# Patient Record
Sex: FEMALE | Race: WHITE
[De-identification: names, ages, dates, MRNs, and addresses within clinical notes are randomized per-mention and may not be internally consistent; named-entity substitution may affect disease eponyms.]

---

## 2020-10-06 ENCOUNTER — HOSPITAL ENCOUNTER (EMERGENCY)
Dept: HOSPITAL 43 - DL.ED | Age: 80
Discharge: HOME | End: 2020-10-06
Payer: MEDICARE

## 2020-10-06 DIAGNOSIS — Z88.8: ICD-10-CM

## 2020-10-06 DIAGNOSIS — G89.29: ICD-10-CM

## 2020-10-06 DIAGNOSIS — H55.00: ICD-10-CM

## 2020-10-06 DIAGNOSIS — R42: Primary | ICD-10-CM

## 2020-10-06 DIAGNOSIS — M25.551: ICD-10-CM

## 2020-10-06 LAB
ANION GAP SERPL CALC-SCNC: 16.7 MEQ/L (ref 7–13)
CHLORIDE SERPL-SCNC: 101 MMOL/L (ref 98–107)
SODIUM SERPL-SCNC: 140 MMOL/L (ref 136–145)

## 2020-10-06 PROCEDURE — 83735 ASSAY OF MAGNESIUM: CPT

## 2020-10-06 PROCEDURE — 36415 COLL VENOUS BLD VENIPUNCTURE: CPT

## 2020-10-06 PROCEDURE — 84484 ASSAY OF TROPONIN QUANT: CPT

## 2020-10-06 PROCEDURE — 70450 CT HEAD/BRAIN W/O DYE: CPT

## 2020-10-06 PROCEDURE — 99285 EMERGENCY DEPT VISIT HI MDM: CPT

## 2020-10-06 PROCEDURE — 93005 ELECTROCARDIOGRAM TRACING: CPT

## 2020-10-06 PROCEDURE — 85025 COMPLETE CBC W/AUTO DIFF WBC: CPT

## 2020-10-06 PROCEDURE — 80053 COMPREHEN METABOLIC PANEL: CPT

## 2020-10-06 NOTE — EDM.PDOC
ED HPI GENERAL MEDICAL PROBLEM





- General


Chief Complaint: General


Stated Complaint: ambulance


Time Seen by Provider: 10/06/20 12:24


Source of Information: Reports: Patient, EMS, RN, RN Notes Reviewed


History Limitations: Reports: No Limitations





- History of Present Illness


INITIAL COMMENTS - FREE TEXT/NARRATIVE: 


Pt arrives to ER from home by ambulance with c/o severe "spinning" dizziness 

with nausea that began suddenly at 1100HRS this morning. Pt sat still for a 

while and the symptoms subsided, but when she moved again the dizziness 

returned. Pt states she has had similar episodes in the past but they were not 

this severe, and didn't last this long so she did not seek medical attention. 

She denies headache, LOC, neck pain, visual changes, fall or injury. She admits 

to right hip pain, but states that is chronic. 





Onset: Today, Sudden


Duration: Intermittent, Recurring, Waxing/Waning


Location: Reports: Generalized


Quality: Reports: Other (Denies pain)


Severity: Severe


Improves with: Reports: Immobilization


Worsens with: Reports: Movement (of body or head)


Associated Symptoms: Reports: No Other Symptoms


  ** Left Hip


Pain Score (Numeric/FACES): 7





- Related Data


                                    Allergies











Allergy/AdvReac Type Severity Reaction Status Date / Time


 


ACE Inhibitors Allergy  Cannot Verified 10/06/20 13:37





   Remember  














Past Medical History


Endocrine/Metabolic History: Reports: Diabetes, Type II





Social & Family History





- Family History


Family Medical History: Noncontributory





- Living Situation & Occupation


Occupation: Retired





ED ROS GENERAL





- Review of Systems


Review Of Systems: Comprehensive ROS is negative, except as noted in HPI.





ED EXAM, GENERAL





- Physical Exam


Exam: See Below


Exam Limited By: No Limitations


General Appearance: Alert, WD/WN, No Apparent Distress


Eye Exam: Bilateral Eye: EOMI, Nystagmus (left lateral gaze), PERRL


Ear Exam: Right Ear: TM Dull, Left Ear: TM normal, Bilateral Ear: Auricle 

Normal, Canal Normal


Nose: Normal Inspection, Normal Mucosa, No Blood


Throat/Mouth: Normal Inspection, Normal Lips, Normal Teeth, Normal Gums, Normal 

Oropharynx, Normal Voice, No Airway Compromise


Head: Atraumatic, Normocephalic


Neck: Normal Inspection, Supple, Non-Tender, Full Range of Motion.  No: Carotid 

Bruit


Respiratory/Chest: No Respiratory Distress, Lungs Clear, Normal Breath Sounds, 

No Accessory Muscle Use, Chest Non-Tender


Cardiovascular: Regular Rate, Rhythm, No Edema


GI/Abdominal: Normal Bowel Sounds, Soft, Non-Tender, No Organomegaly, No 

Distention, No Abnormal Bruit, No Mass


Back Exam: Normal Inspection


Extremities: Normal Inspection, Normal Range of Motion, Non-Tender, Normal 

Capillary Refill, No Pedal Edema


Neurological: Alert, Oriented, CN II-XII Intact, Normal Cognition, Normal Gait, 

Normal Reflexes, No Motor/Sensory Deficits, Other (Reproducible vertiginous 

dizziness with head movement and body movement.)


Psychiatric: Normal Affect, Normal Mood


Skin Exam: Warm, Dry, Intact, Normal Color, No Rash





EKG INTERPRETATION


EKG Date: 10/06/20


Time: 12:17


Rhythm: Other (SR)


Rate (Beats/Min): 74


Axis: LAD-Left Axis Deviation


P-Wave: Present


QRS: Other (LAFB, LVH)


ST-T: Normal


QT: Normal


Comparison: NA - No Prior EKG





Course





- Vital Signs


Last Recorded V/S: 


                                Last Vital Signs











Temp  97.6 F   10/06/20 12:42


 


Pulse  79   10/06/20 12:42


 


Resp  20   10/06/20 12:42


 


BP      


 


Pulse Ox  96   10/06/20 12:42














- Orders/Labs/Meds


Orders: 


                               Active Orders 24 hr











 Category Date Time Status


 


 EKG 12 Lead [EKG Documentation Completion] [RC] STAT Care  10/06/20 12:24 

Active


 


 Peripheral IV Care [RC] .AS DIRECTED Care  10/06/20 12:25 Active


 


 DRUG SCREEN URINE BIORAD [URCHEM] Stat Lab  10/06/20 12:25 Ordered


 


 UA RFX DIMA AND CULT IF INDIC [URIN] Stat Lab  10/06/20 12:25 Ordered


 


 Sodium Chloride 0.9% [Saline Flush] Med  10/06/20 12:25 Active





 10 ml FLUSH ASDIRECTED PRN   


 


 Peripheral IV Insertion Adult [OM.PC] Stat Oth  10/06/20 12:24 Ordered








                                Medication Orders





Sodium Chloride (Saline Flush)  10 ml FLUSH ASDIRECTED PRN


   PRN Reason: Keep Vein Open








Labs: 


                                Laboratory Tests











  10/06/20 10/06/20 Range/Units





  12:47 12:47 


 


WBC  10.1 H   (5.0-10.0)  10^3/uL


 


RBC  3.70 L   (4.2-5.4)  10^6/uL


 


Hgb  9.0 L   (12.0-16.0)  g/dL


 


Hct  29.6 L   (37.0-47.0)  %


 


MCV  80.0   ()  fL


 


MCH  24.3 L   (27.0-34.0)  pg


 


MCHC  30.4 L   (33.0-35.0)  g/dL


 


Plt Count  396   (150-450)  10^3/uL


 


Neut % (Auto)  82.3 H   (42.2-75.2)  %


 


Lymph % (Auto)  10.5 L   (20.5-50.1)  %


 


Mono % (Auto)  5.4   (2-8)  %


 


Eos % (Auto)  1.7   (1.0-3.0)  %


 


Baso % (Auto)  0.1   (0.0-1.0)  %


 


Sodium   140  (136-145)  mmol/L


 


Potassium   3.7  (3.5-5.1)  mmol/L


 


Chloride   101  ()  mmol/L


 


Carbon Dioxide   26  (21-32)  mmol/L


 


Anion Gap   16.7 H  (7-13)  mEq/L


 


BUN   12  (7-18)  mg/dL


 


Creatinine   1.02  (0.55-1.02)  mg/dL


 


Est Cr Clr Drug Dosing   38.62  mL/min


 


Estimated GFR (MDRD)   52  


 


BUN/Creatinine Ratio   11.8  (No establ ref range)  


 


Glucose   122 H  (74-99)  mg/dL


 


Calcium   9.1  (8.5-10.1)  mg/dL


 


Magnesium   1.7 L  (1.8-2.4)  mg/dL


 


Total Bilirubin   0.3  (0.2-1.0)  mg/dL


 


AST   16  (15-37)  U/L


 


ALT   12 L  (14-59)  U/L


 


Alkaline Phosphatase   100  ()  U/L


 


Troponin I   < 0.017  (0.000-0.056)  ng/mL


 


Total Protein   7.1  (6.4-8.2)  g/dL


 


Albumin   3.1 L  (3.4-5.0)  g/dL


 


Globulin   4.0  


 


Albumin/Globulin Ratio   0.78  











Meds: 


Medications











Generic Name Dose Route Start Last Admin





  Trade Name Freq  PRN Reason Stop Dose Admin


 


Sodium Chloride  10 ml  10/06/20 12:25 





  Saline Flush  FLUSH  





  ASDIRECTED PRN  





  Keep Vein Open  














Discontinued Medications














Generic Name Dose Route Start Last Admin





  Trade Name Lashaun  PRN Reason Stop Dose Admin


 


Meclizine HCl  25 mg  10/06/20 12:25  10/06/20 12:37





  Antivert  PO  10/06/20 12:26  25 mg





  ONETIME ONE   Administration


 


Ondansetron HCl  4 mg  10/06/20 14:12  10/06/20 14:18





  Zofran Odt  PO  10/06/20 14:13  4 mg





  ONETIME ONE   Administration


 


Oxycodone/Acetaminophen  1 tab  10/06/20 14:12  10/06/20 14:18





  Percocet 325-5 Mg  PO  10/06/20 14:13  1 tab





  ONETIME ONE   Administration














- Radiology Interpretation


Free Text/Narrative:: 


Subtle abnormalities right parietal/left occipital lobes, no intracranial mass 

effect, hydrocephalus, or bleed per Rad. report.








- Re-Assessments/Exams


Free Text/Narrative Re-Assessment/Exam: 





10/06/20 14:28


Pt's dizziness has resolved following tx in ER.





Departure





- Departure


Time of Disposition: 14:23


Disposition: Home, Self-Care 01


Condition: Good


Clinical Impression: 


 Vertigo, Chronic right hip pain








- Discharge Information


*PRESCRIPTION DRUG MONITORING PROGRAM REVIEWED*: No


*COPY OF PRESCRIPTION DRUG MONITORING REPORT IN PATIENT ANISH: No


Instructions:  Vertigo, Hip Pain


Forms:  ED Department Discharge


Additional Instructions: 


Rx: Meclizine 25mg (for dizziness/vertigo)


Rx: Norco 5mg/325mg (for severe pain)


Do not drive for 8 hours after taking either of these medications.


Follow up in clinic with Nazia Pineda within the next one week for recheck.


Ask your doctor and/or specialist to review your Head CT scan (available on 

[facility] [facility] systems).





Sepsis Event Note (ED)





- Focused Exam


Vital Signs: 


                                   Vital Signs











  Temp Pulse Resp Pulse Ox


 


 10/06/20 12:42  97.6 F  79  20  96














- My Orders


Last 24 Hours: 


My Active Orders





10/06/20 12:24


EKG 12 Lead [EKG Documentation Completion] [RC] STAT 


Peripheral IV Insertion Adult [OM.PC] Stat 





10/06/20 12:25


Peripheral IV Care [RC] .AS DIRECTED 


DRUG SCREEN URINE BIORAD [URCHEM] Stat 


UA RFX DIMA AND CULT IF INDIC [URIN] Stat 


Sodium Chloride 0.9% [Saline Flush]   10 ml FLUSH ASDIRECTED PRN 














- Assessment/Plan


Last 24 Hours: 


My Active Orders





10/06/20 12:24


EKG 12 Lead [EKG Documentation Completion] [RC] STAT 


Peripheral IV Insertion Adult [OM.PC] Stat 





10/06/20 12:25


Peripheral IV Care [RC] .AS DIRECTED 


DRUG SCREEN URINE BIORAD [URCHEM] Stat 


UA RFX DIMA AND CULT IF INDIC [URIN] Stat 


Sodium Chloride 0.9% [Saline Flush]   10 ml FLUSH ASDIRECTED PRN

## 2020-10-06 NOTE — CT
EXAMINATION: Head wo Cont  SEX: Female   AGE: 80 years

 

CLINICAL HISTORY: 80-year-old female experiencing  ACUTE DIZZINESS with nausea

(breast cancer diagnosed 2011). No known trauma. No comparison exams (CT or MRI)

head immediately available.

 

Scan technique: Volume acquisition of data emergency unenhanced CT scan of the

head and brain obtained with the patient lying supine on the Siemens multi slice

scanner [place], [place]. All data archived in

the PACS system for storage, reformatting axial/sagittal/coronal planes and

study (bone/brain windows).

 

Interpretation:

1. Uniformly thick bony calvarium. No sign of pathologic skeletal lesion, skull

fracture, underlying brain contusion or abnormal extracerebral/intracranial

epidural or subdural hematoma.

2. Physiologic midline pineal/falx and symmetric choroid plexus calcifications.

3. *Subtle areas of decreased attenuation parietal lobe on the right and

occipital lobe posteriorly left cerebral hemisphere that may represent

microvascular ischemic change or infarcts but in light of the patient's clinical

history (breast cancer) suggest follow-up unenhanced MRI exam or

contrast-enhanced CT. 

4. No supratentorial or posterior fossa mass effect. Symmetric normal-appearing

ventricular system.

5. No sign of acute intracerebral, intraventricular or subarachnoid bleed.

6. Cerebellum and brainstem unremarkable.

7. Symmetric clear pneumatization of the paranasal and mastoid sinuses.

 

INTERPRETATION: Subtle abnormalities right parietal/left occipital lobes (see

above).

No intracranial mass effect, hydrocephalus or bleed.

## 2020-10-14 ENCOUNTER — HOSPITAL ENCOUNTER (OUTPATIENT)
Dept: HOSPITAL 43 - DL.ENDO | Age: 80
Discharge: HOME | End: 2020-10-14
Attending: INTERNAL MEDICINE
Payer: MEDICARE

## 2020-10-14 DIAGNOSIS — I12.9: ICD-10-CM

## 2020-10-14 DIAGNOSIS — Z90.710: ICD-10-CM

## 2020-10-14 DIAGNOSIS — K25.9: Primary | ICD-10-CM

## 2020-10-14 DIAGNOSIS — N18.9: ICD-10-CM

## 2020-10-14 DIAGNOSIS — Z88.8: ICD-10-CM

## 2020-10-14 DIAGNOSIS — D50.9: ICD-10-CM

## 2020-10-14 DIAGNOSIS — Z98.890: ICD-10-CM

## 2020-10-14 DIAGNOSIS — Z90.10: ICD-10-CM

## 2020-10-14 DIAGNOSIS — M51.86: ICD-10-CM

## 2020-10-14 DIAGNOSIS — Z85.3: ICD-10-CM

## 2020-10-14 PROCEDURE — 87077 CULTURE AEROBIC IDENTIFY: CPT

## 2020-10-14 PROCEDURE — 43239 EGD BIOPSY SINGLE/MULTIPLE: CPT

## 2020-10-14 NOTE — OR
DATE:  10/14/2020

 

PROCEDURE:  Esophagogastroduodenoscopy and multiple pinch biopsies.

 

INSTRUMENT USED:  GIF- Olympus video panendoscope.

 

PREMEDICATIONS:  No oral or topical anesthesia used.  Fentanyl 50 mcg

intravenous, Versed 1 mg intravenous, nasal O2 cannula.

 

The procedure was done under pulse oximetry, BP recording, and cardiac monitor.

 

INDICATION:  The patient with unexplained iron-deficiency anemia.

Esophagogastroduodenoscopy is performed for detection of any active erosive

lesions, Mortensen esophagus and/or malignancy also under consideration, H pylori

status to be determined, endoscopic hemostasis therapy if needed.

 

PROCEDURE IN DETAIL:  The scope was passed with ease.  Adequate visualization of

the esophagus was made from proximal to distal areas.  No upper esophageal

lesions identified.  No distal esophageal stricture.  No uphill or downhill

esophageal varices.  No Chari-Casillas tear.  No evidence of erosive esophagitis

by [place] criteria.  No esophageal polyp or tumor mass identified.  Z-line

was seen at around 39 cm distal to the oral verge, configuration consistent with

grade 1 by ZAP classification.  No proximal gastric varices noted.  Gastric

fundus examination by retroflexion showed no polypoid lesions.  In the mid

gastric body, multiple benign-appearing erosions were noted without bleeding

from them.  No gastric ulcer, malignant mass, or vascular ectasia identified.

Multiple pinch biopsies were taken from the gastric antrum and proximal body and

sent for PyloriTek test for H pylori, and if negative in an hour, the tissue is

to be sent for histopathology.  Duodenal bulb showed no ulcer.  Visualized

second part of the duodenum is unremarkable.  No bleeding was noted from any of

the visualized areas.  Photographs were taken of the duodenal bulb, gastric

antrum, fundus, and distal esophagus.

 

IMPRESSION:  Gastric body erosions.

 

The patient tolerated the procedure well.

 

DD:  10/14/2020 07:19:57

DT:  10/14/2020 07:44:13

[facility]

Job #:  [id number]

## 2020-10-20 ENCOUNTER — HOSPITAL ENCOUNTER (OUTPATIENT)
Dept: HOSPITAL 43 - DL.ENDO | Age: 80
End: 2020-10-20
Attending: INTERNAL MEDICINE
Payer: MEDICARE

## 2020-10-20 DIAGNOSIS — K57.30: ICD-10-CM

## 2020-10-20 DIAGNOSIS — Z90.10: ICD-10-CM

## 2020-10-20 DIAGNOSIS — N18.9: ICD-10-CM

## 2020-10-20 DIAGNOSIS — D12.0: Primary | ICD-10-CM

## 2020-10-20 DIAGNOSIS — Z88.8: ICD-10-CM

## 2020-10-20 DIAGNOSIS — I12.9: ICD-10-CM

## 2020-10-20 DIAGNOSIS — I25.10: ICD-10-CM

## 2020-10-20 DIAGNOSIS — D50.9: ICD-10-CM

## 2020-10-20 NOTE — OR
DATE:  10/20/2020

 

PROCEDURES:  Total colonoscopy, narrow-band imaging and multiple pinch biopsies.

 

INSTRUMENT USED:  PCF-H190DL Olympus video colonoscope.

 

PREMEDICATIONS:  Fentanyl 100 mcg intravenous, Versed 4 mg intravenous.  Nasal

O2 cannula.

 

The procedure was done under pulse oximetry, BP recording, and cardiac monitor.

 

INDICATION:  The patient with iron deficiency anemia.  Colonoscopic examination

is done for detection of any polypoid lesions and removal, endoscopic hemostasis

therapy if needed.

 

DESCRIPTION OF PROCEDURE:  Initial rectal exam was unremarkable.  Rigid anoscopy

showed some prolapsing distal rectal mucosa.  The colonoscope was passed with

ease.  Numerous scattered diverticula were noted in the distal left colon along

with deformity.  The scope was passed with ease up to the ileocecal area.

Photographs were taken of the ileocecal area showing large circumferential

malignant mass around the ileocecal junction, NBI views were obtained,

photographs were taken.  No bleeding was noted from any of the visualized areas

at the commencement of the examination.  The bowel preparation was found to be

adequate. Fackler scale 2 in the right colon and 3 in other regions, total score

8.  Numerous pinch biopsies were taken from the malignant mass and sent for

histopathology.  No vascular ectasia.  No large isolated ulcerations seen.  No

evidence of diffuse inflammatory bowel disease in the form of friability,

contact bleeding, or ulcerations.  Probing the proximal sides of folds and

flexures using adequate distention and clearing up the stool material,

withdrawal of the scope was made, cecum to rectum time over 6 minutes.  No

bleeding was noted from any of the visualized areas at the completion of

examination.

 

IMPRESSION:  Diverticulosis.

Cecal malignancy.

The patient tolerated the procedure well.

 

DD:  10/20/2020 08:05:04

DT:  10/20/2020 09:02:39

[facility]

Job #:  [id number]

## 2023-05-28 ENCOUNTER — HOSPITAL ENCOUNTER (EMERGENCY)
Dept: HOSPITAL 43 - DL.ED | Age: 83
Discharge: HOME | End: 2023-05-28
Payer: MEDICARE

## 2023-05-28 DIAGNOSIS — C34.90: ICD-10-CM

## 2023-05-28 DIAGNOSIS — I25.10: ICD-10-CM

## 2023-05-28 DIAGNOSIS — J44.9: ICD-10-CM

## 2023-05-28 DIAGNOSIS — E11.22: ICD-10-CM

## 2023-05-28 DIAGNOSIS — I13.0: ICD-10-CM

## 2023-05-28 DIAGNOSIS — J44.1: ICD-10-CM

## 2023-05-28 DIAGNOSIS — Z87.891: ICD-10-CM

## 2023-05-28 DIAGNOSIS — U07.1: Primary | ICD-10-CM

## 2023-05-28 DIAGNOSIS — I50.9: ICD-10-CM

## 2023-05-28 DIAGNOSIS — E87.6: ICD-10-CM

## 2023-05-28 DIAGNOSIS — J10.1: ICD-10-CM

## 2023-05-28 DIAGNOSIS — M79.89: ICD-10-CM

## 2023-05-28 DIAGNOSIS — N18.9: ICD-10-CM

## 2023-05-28 LAB
ALBUMIN SERPL-MCNC: 2.6 G/DL (ref 3.4–5)
ALBUMIN/GLOB SERPL: 0.68 {RATIO}
ALP SERPL-CCNC: 88 U/L (ref 46–116)
ALT SERPL-CCNC: 19 U/L (ref 14–59)
AMPHET UR QL SCN: NEGATIVE
AMPHET UR QL SCN: NEGATIVE
AMPHETAMINES UR QL SCN>500 NG/ML: NEGATIVE
ANION GAP SERPL CALC-SCNC: 9.2 MEQ/L (ref 7–13)
APPEARANCE UR: CLEAR
APTT PPP: 26.6 SEC (ref 22–34)
AST SERPL-CCNC: 23 U/L (ref 15–37)
BACTERIA URNS QL MICRO: (no result) /HPF
BARBITURATES UR QL SCN: NEGATIVE
BASOPHILS NFR BLD AUTO: 0.1 % (ref 0–1)
BILIRUB SERPL-MCNC: 0.4 MG/DL (ref 0.2–1)
BILIRUB UR STRIP-MCNC: NEGATIVE MG/DL
BNP SERPL-MCNC: 188 PG/ML (ref 0–100)
BUN SERPL-MCNC: 21 MG/DL (ref 7–18)
BUN/CREAT SERPL: 25
CALCIUM SERPL-MCNC: 9.2 MG/DL (ref 8.5–10.1)
CHLORIDE SERPL-SCNC: 99 MMOL/L (ref 98–107)
CO2 SERPL-SCNC: 34 MMOL/L (ref 21–32)
COLOR UR: YELLOW
CREAT CL 24H UR+SERPL-VRATE: 41.54 ML/MIN
CREAT SERPL-MCNC: 0.84 MG/DL (ref 0.55–1.02)
CRP SERPL-MCNC: 8.5 MG/DL (ref 0–0.9)
EGFRCR SERPLBLD CKD-EPI 2021: 70 ML/MIN (ref 60–?)
EOSINOPHIL NFR BLD AUTO: 2 % (ref 1–3)
EPI CELLS #/AREA URNS HPF: (no result) /HPF
GLOBULIN SER-MCNC: 3.8 G/DL
GLUCOSE SERPL-MCNC: 127 MG/DL (ref 70–99)
GLUCOSE UR STRIP-MCNC: NEGATIVE MG/DL
HCT VFR BLD AUTO: 38.6 % (ref 37–47)
HGB BLD-MCNC: 12.2 G/DL (ref 12–16)
INR PPP: 0.9 (ref 0.9–1.2)
KETONES UR STRIP-MCNC: 40 MG/DL
LACTATE SERPL-SCNC: 1.4 MMOL/L (ref 0.4–2)
LYMPHOCYTES NFR BLD AUTO: 10.3 % (ref 20.5–50.1)
MCH RBC QN AUTO: 30.5 PG (ref 27–34)
MCHC RBC AUTO-ENTMCNC: 31.6 G/DL (ref 33–35)
MCHC RBC AUTO-ENTMCNC: 96.5 FL (ref 80–100)
MDMA UR QL SCN: NEGATIVE
MONOCYTES NFR BLD AUTO: 9.4 % (ref 2–8)
MUCOUS THREADS URNS QL MICRO: (no result) /LPF
NEUTROPHILS NFR BLD AUTO: 78.2 % (ref 42.2–75.2)
NITRITE UR QL: NEGATIVE
OXYCODONE UR QL SCN: NEGATIVE
PCP UR QL SCN: NEGATIVE
PH UR STRIP: 5.5 [PH] (ref 5–9)
PLATELET # BLD AUTO: 318 10^3/UL (ref 150–450)
POTASSIUM SERPL-SCNC: 3.2 MMOL/L (ref 3.5–5.1)
PROT SERPL-MCNC: 6.4 G/DL (ref 6.4–8.2)
PROT UR STRIP-MCNC: NEGATIVE MG/DL
PROTHROMBIN TIME: 9.1 SEC (ref 9–12)
RBC # BLD AUTO: 4 10^6/UL (ref 4.2–5.4)
RBC # URNS HPF: (no result) /HPF (ref 0–5)
RBC UR QL: (no result)
SODIUM SERPL-SCNC: 139 MMOL/L (ref 136–145)
SP GR UR STRIP: 1.02 (ref 1–1.03)
TRICYCLICS UR QL SCN: NEGATIVE
TSH SERPL DL<=0.005 MIU/L-ACNC: 0.45 UIU/ML (ref 0.36–3.74)
UROBILINOGEN UR STRIP-ACNC: 0.2 MG/DL (ref 0.2–1)
WBC # BLD AUTO: 9 10^3/UL (ref 5–10)
WBC UR QL: (no result) /HPF

## 2023-05-28 PROCEDURE — U0002 COVID-19 LAB TEST NON-CDC: HCPCS
